# Patient Record
(demographics unavailable — no encounter records)

---

## 2024-10-28 NOTE — ASSESSMENT
[FreeTextEntry1] : 38 yo F. with h/o, alpha thal trait,  HTN, spinal stenosis, obesity, fibroids, NHL. Seen in follow up for iron deficiency anemia s/p 2 PRBC transfusion and Feraheme x 1 dose in August 2021. Patient has a history of hemorrhoidal bleeding, and menorrhagia both of which seems to not be an issue currently.  Hypertension On Temilsartan, follow with pcp ERIKA: Not on oral iron.  10/28/24-  WBC 8.93  , Hb 8.0 g/dl  , Hct 29.3% , .  Patient is symptomatic, will start Venofer 200 mg weekly x 5 doses.   RTC prn.

## 2024-10-28 NOTE — REVIEW OF SYSTEMS
[Negative] : Heme/Lymph [Palpitations] : palpitations [Abdominal Pain] : no abdominal pain [Dizziness] : dizziness

## 2024-10-28 NOTE — ADDENDUM
[FreeTextEntry1] : Laboratory studies from [facility] c/w anticardiolipin antibody IgM mildly elevated, no history of blood clots. Recommend prophylaxis with Lovenox 40 mg SQ x 2 weeks after bariatric surgery.

## 2024-10-28 NOTE — HISTORY OF PRESENT ILLNESS
[0 - No Distress] : Distress Level: 0 [90: Able to carry normal activity; minor signs or symptoms of disease.] : 90: Able to carry normal activity; minor signs or symptoms of disease.  [de-identified] : 38 yo woman with PMHx of HTN, spinal stenosis, ERIKA, obesity, fibroids, NHL, Homozygous for alpha thalassemia,  referred for evaluation of anemia.\par  \par  Patient complaints of having fatigue, sob with exertion, dizziness, lightheadedness, patient is not eating a well balanced diet. Her menses last approx 7 days, bleeds heavenly for 2 days. Patient has history of iron deficiency anemia approx 15 years ago, required iron infusions with appropriate response. Denies fevers, night sweats or weight loss. \par  \par  Labs 10/17/19: WBC 7, Hb 9.8 g/dl, Hct 34, MCV 59, RDW 20.9, .  [de-identified] : Patient presents here today for a follow up.  Denies any fever/chills, night sweats, fatigue, weight loss, headache, positive for dizziness, chest pain, positive for palpations or any other medical complaints.   S/P Feraheme x 2 4/2022.   10/28/24-  WBC 8.93  , Hb 8.0 g/dl  , Hct 29.3% , .   No other changes in medical, surgical or social history since 3/2/23

## 2025-01-30 NOTE — ASSESSMENT
[FreeTextEntry1] : 38 yo F. with h/o, alpha thal trait,  HTN, spinal stenosis, obesity, fibroids, NHL. Seen in follow up for iron deficiency anemia s/p 2 PRBC transfusion and Feraheme x 1 dose in August 2021. Patient has a history of hemorrhoidal bleeding, and menorrhagia both of which seems to not be an issue currently.  Hypertension On Temilsartan, follow with pcp ERIKA: Not on oral iron.  12/27/24- WBC 7.28, Hb 10.8g/dl, Hct 37.2%, MCV 67.1, PLTs 241.  12/2024- S/P  Venofer 200 mg weekly x 5 doses.  Iron studies pending.  Scheduled for D&C on 2/2025. IVF will likely be done after the D&C.  Greater than 50% of the encounter time was spent on counseling and coordination of care for IIDA     and I have spent 40   minutes of face-to-face time with the patient.  RTC prn.

## 2025-01-30 NOTE — HISTORY OF PRESENT ILLNESS
[0 - No Distress] : Distress Level: 0 [90: Able to carry normal activity; minor signs or symptoms of disease.] : 90: Able to carry normal activity; minor signs or symptoms of disease.  [de-identified] : 38 yo woman with PMHx of HTN, spinal stenosis, ERIKA, obesity, fibroids, NHL, Homozygous for alpha thalassemia,  referred for evaluation of anemia.\par  \par  Patient complaints of having fatigue, sob with exertion, dizziness, lightheadedness, patient is not eating a well balanced diet. Her menses last approx 7 days, bleeds heavenly for 2 days. Patient has history of iron deficiency anemia approx 15 years ago, required iron infusions with appropriate response. Denies fevers, night sweats or weight loss. \par  \par  Labs 10/17/19: WBC 7, Hb 9.8 g/dl, Hct 34, MCV 59, RDW 20.9, .  [de-identified] : Patient presents here today for a follow up.  Denies any fever/chills, night sweats, fatigue, weight loss, headache, positive for dizziness, chest pain, positive for palpations or any other medical complaints. Patient had a recent admission to the hospital with menorrhagia, received 1-2 PRBCs.  12/27/24- WBC 7.28, Hb 10.8g/dl, Hct 37.2%, MCV 67.1, PLTs 241.   No other changes in medical, surgical or social history since 10/2024.

## 2025-01-30 NOTE — REVIEW OF SYSTEMS
[Palpitations] : no palpitations [Abdominal Pain] : no abdominal pain [Dizziness] : no dizziness [Negative] : Endocrine

## 2025-07-03 NOTE — HISTORY OF PRESENT ILLNESS
[0 - No Distress] : Distress Level: 0 [90: Able to carry normal activity; minor signs or symptoms of disease.] : 90: Able to carry normal activity; minor signs or symptoms of disease.  [de-identified] : 38 yo woman with PMHx of HTN, spinal stenosis, ERIKA, obesity, fibroids, NHL, Homozygous for alpha thalassemia,  referred for evaluation of anemia.\par  \par  Patient complaints of having fatigue, sob with exertion, dizziness, lightheadedness, patient is not eating a well balanced diet. Her menses last approx 7 days, bleeds heavenly for 2 days. Patient has history of iron deficiency anemia approx 15 years ago, required iron infusions with appropriate response. Denies fevers, night sweats or weight loss. \par  \par  Labs 10/17/19: WBC 7, Hb 9.8 g/dl, Hct 34, MCV 59, RDW 20.9, .  [de-identified] : Patient presents here today for a follow up.  Denies any fever/chills, night sweats, fatigue, weight loss. 7/3/25- WBC 8.34, Hb 12.2 g/dl, Hct 38.5%, .   No other changes in medical, surgical or social history since 1/30/2025

## 2025-07-03 NOTE — REVIEW OF SYSTEMS
[Negative] : Heme/Lymph [Palpitations] : no palpitations [Abdominal Pain] : no abdominal pain [Dizziness] : no dizziness

## 2025-07-03 NOTE — ASSESSMENT
[FreeTextEntry1] : 39 yo F. with h/o, alpha thal trait,  HTN, spinal stenosis, obesity, fibroids, NHL. Seen in follow up for iron deficiency anemia s/p 2 PRBC transfusion and Feraheme x 1 dose in August 2021. Patient has a history of hemorrhoidal bleeding, and menorrhagia both of which seems to not be an issue currently.  Hypertension On Temilsartan, follow with pcp ERIKA: Not on oral iron.  12/27/24- WBC 7.28, Hb 10.8g/dl, Hct 37.2%, MCV 67.1, PLTs 241.  12/2024- S/P  Venofer 200 mg weekly x 5 doses.  Iron studies pending.  Scheduled for D&C on 2/2025.   7/3/25- WBC 8.34, Hb 12.2 g/dl, Hct 38.5%, .  Prescribed Accrufer 30 mg daily.  Patient is having removal of fibroids, has hematological clearance for surgery.   Greater than 50% of the encounter time was spent on counseling and coordination of care for IIDA     and I have spent 40   minutes of face-to-face time with the patient.  RTC prn.